# Patient Record
Sex: FEMALE | Race: BLACK OR AFRICAN AMERICAN | NOT HISPANIC OR LATINO | Employment: FULL TIME | URBAN - METROPOLITAN AREA
[De-identification: names, ages, dates, MRNs, and addresses within clinical notes are randomized per-mention and may not be internally consistent; named-entity substitution may affect disease eponyms.]

---

## 2023-08-08 ENCOUNTER — HOSPITAL ENCOUNTER (EMERGENCY)
Facility: HOSPITAL | Age: 63
Discharge: HOME | End: 2023-08-08
Attending: STUDENT IN AN ORGANIZED HEALTH CARE EDUCATION/TRAINING PROGRAM
Payer: COMMERCIAL

## 2023-08-08 VITALS
DIASTOLIC BLOOD PRESSURE: 82 MMHG | RESPIRATION RATE: 18 BRPM | HEIGHT: 66 IN | OXYGEN SATURATION: 98 % | SYSTOLIC BLOOD PRESSURE: 137 MMHG | TEMPERATURE: 98 F | HEART RATE: 65 BPM | WEIGHT: 160 LBS | BODY MASS INDEX: 25.71 KG/M2

## 2023-08-08 DIAGNOSIS — R07.9 CHEST PAIN: ICD-10-CM

## 2023-08-08 DIAGNOSIS — R07.89 CHEST WALL PAIN: Primary | ICD-10-CM

## 2023-08-08 LAB
ALBUMIN SERPL-MCNC: 4 G/DL (ref 3.4–4.8)
ALBUMIN/GLOB SERPL: 1.2 RATIO (ref 1.1–2)
ALP SERPL-CCNC: 54 UNIT/L (ref 40–150)
ALT SERPL-CCNC: 17 UNIT/L (ref 0–55)
AST SERPL-CCNC: 20 UNIT/L (ref 5–34)
BASOPHILS # BLD AUTO: 0.05 X10(3)/MCL
BASOPHILS NFR BLD AUTO: 1 %
BILIRUB SERPL-MCNC: 0.4 MG/DL
BNP BLD-MCNC: 61.5 PG/ML
BUN SERPL-MCNC: 13.4 MG/DL (ref 9.8–20.1)
CALCIUM SERPL-MCNC: 9.6 MG/DL (ref 8.4–10.2)
CHLORIDE SERPL-SCNC: 106 MMOL/L (ref 98–107)
CO2 SERPL-SCNC: 28 MMOL/L (ref 23–31)
CREAT SERPL-MCNC: 0.84 MG/DL (ref 0.55–1.02)
EOSINOPHIL # BLD AUTO: 0.13 X10(3)/MCL (ref 0–0.9)
EOSINOPHIL NFR BLD AUTO: 2.5 %
ERYTHROCYTE [DISTWIDTH] IN BLOOD BY AUTOMATED COUNT: 11.2 % (ref 11.5–17)
GFR SERPLBLD CREATININE-BSD FMLA CKD-EPI: >60 MLS/MIN/1.73/M2
GLOBULIN SER-MCNC: 3.3 GM/DL (ref 2.4–3.5)
GLUCOSE SERPL-MCNC: 97 MG/DL (ref 82–115)
HCT VFR BLD AUTO: 36.1 % (ref 37–47)
HGB BLD-MCNC: 11.9 G/DL (ref 12–16)
IMM GRANULOCYTES # BLD AUTO: 0.02 X10(3)/MCL (ref 0–0.04)
IMM GRANULOCYTES NFR BLD AUTO: 0.4 %
LYMPHOCYTES # BLD AUTO: 2.58 X10(3)/MCL (ref 0.6–4.6)
LYMPHOCYTES NFR BLD AUTO: 50.4 %
MCH RBC QN AUTO: 29.1 PG (ref 27–31)
MCHC RBC AUTO-ENTMCNC: 33 G/DL (ref 33–36)
MCV RBC AUTO: 88.3 FL (ref 80–94)
MONOCYTES # BLD AUTO: 0.68 X10(3)/MCL (ref 0.1–1.3)
MONOCYTES NFR BLD AUTO: 13.3 %
NEUTROPHILS # BLD AUTO: 1.66 X10(3)/MCL (ref 2.1–9.2)
NEUTROPHILS NFR BLD AUTO: 32.4 %
NRBC BLD AUTO-RTO: 0 %
PLATELET # BLD AUTO: 320 X10(3)/MCL (ref 130–400)
PMV BLD AUTO: 9.9 FL (ref 7.4–10.4)
POTASSIUM SERPL-SCNC: 3.6 MMOL/L (ref 3.5–5.1)
PROT SERPL-MCNC: 7.3 GM/DL (ref 5.8–7.6)
RBC # BLD AUTO: 4.09 X10(6)/MCL (ref 4.2–5.4)
SODIUM SERPL-SCNC: 142 MMOL/L (ref 136–145)
TROPONIN I SERPL-MCNC: 0.02 NG/ML (ref 0–0.04)
TROPONIN I SERPL-MCNC: <0.01 NG/ML (ref 0–0.04)
WBC # SPEC AUTO: 5.12 X10(3)/MCL (ref 4.5–11.5)

## 2023-08-08 PROCEDURE — 93005 ELECTROCARDIOGRAM TRACING: CPT

## 2023-08-08 PROCEDURE — 99285 EMERGENCY DEPT VISIT HI MDM: CPT | Mod: 25

## 2023-08-08 PROCEDURE — 63600175 PHARM REV CODE 636 W HCPCS: Performed by: STUDENT IN AN ORGANIZED HEALTH CARE EDUCATION/TRAINING PROGRAM

## 2023-08-08 PROCEDURE — 84484 ASSAY OF TROPONIN QUANT: CPT | Performed by: NURSE PRACTITIONER

## 2023-08-08 PROCEDURE — 80053 COMPREHEN METABOLIC PANEL: CPT | Performed by: NURSE PRACTITIONER

## 2023-08-08 PROCEDURE — 83880 ASSAY OF NATRIURETIC PEPTIDE: CPT | Performed by: NURSE PRACTITIONER

## 2023-08-08 PROCEDURE — 84484 ASSAY OF TROPONIN QUANT: CPT | Performed by: STUDENT IN AN ORGANIZED HEALTH CARE EDUCATION/TRAINING PROGRAM

## 2023-08-08 PROCEDURE — 25000003 PHARM REV CODE 250: Performed by: STUDENT IN AN ORGANIZED HEALTH CARE EDUCATION/TRAINING PROGRAM

## 2023-08-08 PROCEDURE — 96374 THER/PROPH/DIAG INJ IV PUSH: CPT

## 2023-08-08 PROCEDURE — 85025 COMPLETE CBC W/AUTO DIFF WBC: CPT | Performed by: NURSE PRACTITIONER

## 2023-08-08 RX ORDER — ASPIRIN 325 MG
325 TABLET ORAL
Status: DISCONTINUED | OUTPATIENT
Start: 2023-08-08 | End: 2023-08-08

## 2023-08-08 RX ORDER — KETOROLAC TROMETHAMINE 30 MG/ML
15 INJECTION, SOLUTION INTRAMUSCULAR; INTRAVENOUS
Status: COMPLETED | OUTPATIENT
Start: 2023-08-08 | End: 2023-08-08

## 2023-08-08 RX ORDER — METHOCARBAMOL 500 MG/1
1000 TABLET, FILM COATED ORAL 3 TIMES DAILY
Qty: 30 TABLET | Refills: 0 | Status: SHIPPED | OUTPATIENT
Start: 2023-08-08 | End: 2023-08-13

## 2023-08-08 RX ORDER — NAPROXEN SODIUM 220 MG/1
162 TABLET, FILM COATED ORAL
Status: DISCONTINUED | OUTPATIENT
Start: 2023-08-08 | End: 2023-08-09 | Stop reason: HOSPADM

## 2023-08-08 RX ORDER — METHOCARBAMOL 500 MG/1
1000 TABLET, FILM COATED ORAL
Status: COMPLETED | OUTPATIENT
Start: 2023-08-08 | End: 2023-08-08

## 2023-08-08 RX ADMIN — METHOCARBAMOL 1000 MG: 500 TABLET ORAL at 10:08

## 2023-08-08 RX ADMIN — KETOROLAC TROMETHAMINE 15 MG: 30 INJECTION, SOLUTION INTRAMUSCULAR; INTRAVENOUS at 10:08

## 2023-08-09 NOTE — DISCHARGE INSTRUCTIONS
Follow-up with the cardiologist.      Follow-up with the primary care physician.      Return to the emergency department if any new or worsening pain, shortness of breath, difficulty breathing, fever, leg swelling, nausea, vomiting, or any other concerns.

## 2023-08-09 NOTE — FIRST PROVIDER EVALUATION
Medical screening examination initiated.  I have conducted a focused provider triage encounter, findings are as follows:    Brief history of present illness:  64 y/o female who presents with chest pain that started around 11AM. Slight sob. No n/v.     There were no vitals filed for this visit.    Pertinent physical exam:  alert, nonlabored, ambulatory     Brief workup plan:  ekg, labs, urine    Preliminary workup initiated; this workup will be continued and followed by the physician or advanced practice provider that is assigned to the patient when roomed.

## 2023-08-09 NOTE — ED PROVIDER NOTES
Encounter Date: 8/8/2023    SCRIBE #1 NOTE: I, Temo You, am scribing for, and in the presence of,  Eddie Ceron MD. I have scribed the following portions of the note - Other sections scribed: HPI,ROS,PE.       History     Chief Complaint   Patient presents with    Chest Pain     Sitting at desk at work, sudden right chest wall pain, +SOB. Light intermittent pain now. No SOB at this time. Denies cardiac hx. Hx HTN, high chol. Pain does get worse with some movement     62 y/o female with PMHx of HTN and HLD presents to ED c/o right chest pain. Pt reports having sharp right chest pain that worsens when deep breathing. She denies any leg swelling or SOB. She reports leaving work to go to lunch and the pain started. She reports she lifted heavy things at work today. She reports taking an aspirin this morning.     Cardio: Dr. Davin Martinez MD    The history is provided by the patient. No  was used.   Chest Pain  The current episode started today. The quality of the pain is described as sharp. Chest pain is worsened by deep breathing. Pertinent negatives for primary symptoms include no fever, no shortness of breath and no abdominal pain.   Pertinent negatives for associated symptoms include no weakness.   Her past medical history is significant for hyperlipidemia and hypertension.     Review of patient's allergies indicates:   Allergen Reactions    Codeine     Penicillins     Sulfa (sulfonamide antibiotics) Rash     Mouth sores     History reviewed. No pertinent past medical history.  History reviewed. No pertinent surgical history.  History reviewed. No pertinent family history.     Review of Systems   Constitutional:  Negative for fever.   HENT:  Negative for sore throat.    Eyes:  Negative for visual disturbance.   Respiratory:  Negative for shortness of breath.    Cardiovascular:  Positive for chest pain. Negative for leg swelling.   Gastrointestinal:  Negative for abdominal pain.    Genitourinary:  Negative for dysuria.   Musculoskeletal:  Negative for joint swelling.   Skin:  Negative for rash.   Neurological:  Negative for weakness.   Psychiatric/Behavioral:  Negative for confusion.    All other systems reviewed and are negative.      Physical Exam     Initial Vitals [08/08/23 1956]   BP Pulse Resp Temp SpO2   (!) 168/89 66 13 98.4 °F (36.9 °C) 98 %      MAP       --         Physical Exam    Nursing note and vitals reviewed.  Constitutional: She appears well-developed and well-nourished.   HENT:   Head: Normocephalic and atraumatic.   Eyes: EOM are normal. Pupils are equal, round, and reactive to light.   Neck:   Normal range of motion.  Cardiovascular:  Normal rate, regular rhythm, normal heart sounds and intact distal pulses.           No murmur heard.  Pulmonary/Chest: Breath sounds normal. No respiratory distress. She has no wheezes. She has no rales. She exhibits tenderness (right chest wall).   Abdominal: Abdomen is soft. She exhibits no distension. There is no abdominal tenderness. There is no rebound.   Musculoskeletal:         General: No tenderness or edema. Normal range of motion.      Cervical back: Normal range of motion.     Neurological: She is alert. She has normal strength. No cranial nerve deficit. GCS score is 15. GCS eye subscore is 4. GCS verbal subscore is 5. GCS motor subscore is 6.   Skin: Skin is warm and dry. Capillary refill takes less than 2 seconds. No rash noted. No erythema.   Psychiatric: She has a normal mood and affect.         ED Course   Procedures  Labs Reviewed   CBC WITH DIFFERENTIAL - Abnormal; Notable for the following components:       Result Value    RBC 4.09 (*)     Hgb 11.9 (*)     Hct 36.1 (*)     RDW 11.2 (*)     Neut # 1.66 (*)     All other components within normal limits   TROPONIN I - Normal   B-TYPE NATRIURETIC PEPTIDE - Normal   TROPONIN I - Normal   COMPREHENSIVE METABOLIC PANEL     EKG Readings: (Independently Interpreted)   Initial  Reading: No STEMI. Rhythm: Normal Sinus Rhythm. Heart Rate: 68. Ectopy: No Ectopy. Conduction: Normal. ST Segments: Normal ST Segments. T Waves: Normal. Axis: Normal.   Taken at 1952     Left atrial enlargement.      ECG Results              EKG 12-lead (Final result)  Result time 08/08/23 21:02:32      Final result by Interface, Lab In [facility] (08/08/23 21:02:32)                   Narrative:    Test Reason : R07.9,    Vent. Rate : 068 BPM     Atrial Rate : 068 BPM     P-R Int : 190 ms          QRS Dur : 084 ms      QT Int : 412 ms       P-R-T Axes : 069 008 063 degrees     QTc Int : 438 ms    Normal sinus rhythm  Possible Left atrial enlargement  T wave abnormality, consider anterior ischemia  Abnormal ECG  No previous ECGs available  Confirmed by Valdez Mayo MD ([id number]) on 8/8/2023 9:02:18 PM    Referred By:             Confirmed By:Valdez Mayo MD                                     EKG 12-LEAD (Final result)  Result time 08/18/23 07:14:02      Final result by Unknown User (08/18/23 07:14:02)                                      Imaging Results              X-Ray Chest 1 View (Final result)  Result time 08/08/23 22:57:19      Final result by Josue Ventura MD (08/08/23 22:57:19)                   Impression:      NO ACUTE CARDIOPULMONARY PROCESS IDENTIFIED.      Electronically signed by: Josue Ventura  Date:    08/08/2023  Time:    22:57               Narrative:    EXAMINATION:  XR CHEST 1 VIEW    CLINICAL HISTORY:  ches tpain;    TECHNIQUE:  One view    COMPARISON:  None available.    FINDINGS:  Cardiopericardial silhouette is within normal limits. Lungs are without dense focal or segmental consolidation, congestive process, pleural effusions or pneumothorax.                                    X-Rays:   Independently Interpreted Readings:   Chest X-Ray: Normal heart size.  No infiltrates.  No acute abnormalities.     Medications   methocarbamoL tablet 1,000 mg (1,000 mg Oral Given 8/8/23 4354)   ketorolac  injection 15 mg (15 mg Intravenous Given 8/8/23 2227)     Medical Decision Making:   History:   I obtained history from: someone other than patient.       <> Summary of History: Collateral from family.  Old Medical Records: I decided to obtain old medical records.  Old Records Summarized: records from clinic visits, records from previous admission(s) and records from another hospital.       <> Summary of Records: Reviewed old records history of allergies to sulfa, codeine  Initial Assessment:   Chest wall discomfort  Differential Diagnosis:   Judging by the patient's chief complaint and pertinent history, the patient has the following possible differential diagnoses, including but not limited to the following.  Some of these are deemed to be lower likelihood and some more likely based on my physical exam and history combined with possible lab work and/or imaging studies.   Please see the pertinent studies, and refer to the HPI.  Some of these diagnoses will take further evaluation to fully rule out, perhaps as an outpatient and the patient was encouraged to follow up when discharged for more comprehensive evaluation.    Chest pain emergent diagnoses: ACS, PE, dissection, cardiac tamponade, tension pneumothorax.    Chest pain non-emergent diagnoses: Musculoskeletal, trauma, pleurisy, pneumonia, pleural effusion, GERD, other GI, neurologic, psychiatric and other non emergent diagnoses considered.        Independently Interpreted Test(s):   I have ordered and independently interpreted X-rays - see prior notes.  I have ordered and independently interpreted EKG Reading(s) - see prior notes  Clinical Tests:   Lab Tests: Reviewed and Ordered  Radiological Study: Reviewed and Ordered  Medical Tests: Ordered and Reviewed  ED Management:    Patient is a 63-year-old female presents to the emergency department for chest pain.  See HPI.  See physical exam.  EKG without ST elevation.  Lab work as noted.  Serial troponins  negative.  Chest x-ray without evidence of widened mediastinum, pneumothorax, or infiltrate.  Low risk by well's.  Suspect likely musculoskeletal pain, given that is reproducible on examination.  However patient with risk factors including age.  Discussed need for follow-up with cardiology.  Discussed need for follow-up with primary care physician.  Discussed return precautions.  All results discussed with the patient.  Answered all questions at this time.  Hemodynamically stable for continued outpatient management strict return precautions.  Patient and family verbalized understanding and agreed to plan.          Scribe Attestation:   Scribe #1: I performed the above scribed service and the documentation accurately describes the services I performed. I attest to the accuracy of the note.    Attending Attestation:           Physician Attestation for Scribe:  Physician Attestation Statement for Scribe #1: I, Eddie Ceron MD, reviewed documentation, as scribed by Temo You in my presence, and it is both accurate and complete.         Medical Decision Making  Problems Addressed:  Chest pain: acute illness or injury that poses a threat to life or bodily functions  Chest wall pain: acute illness or injury that poses a threat to life or bodily functions    Amount and/or Complexity of Data Reviewed  External Data Reviewed: labs, radiology and ECG.  Labs: ordered.  Radiology: ordered and independent interpretation performed.  ECG/medicine tests: ordered and independent interpretation performed.    Risk  OTC drugs.  Prescription drug management.                        Clinical Impression:   Final diagnoses:  [R07.9] Chest pain  [R07.89] Chest wall pain (Primary)        ED Disposition Condition    Discharge Stable          ED Prescriptions       Medication Sig Dispense Start Date End Date Auth. Provider    methocarbamoL (ROBAXIN) 500 MG Tab () Take 2 tablets (1,000 mg total) by mouth 3 (three) times daily. for 5  days 30 tablet 8/8/2023 8/13/2023 Eddie Ceron MD          Follow-up Information       Follow up With Specialties Details Why Contact Info    Meeks, Claude H., MD Family Medicine   [facility] [address] [place] [id number]  [phone]      Your primary care physician.        Davin Martinez MD Cardiovascular Disease   [address] [facility] [address]  [address]  [facility] [address]  [phone]               Eddie Ceron MD  09/04/23 [id number]